# Patient Record
Sex: FEMALE | Race: WHITE | Employment: OTHER | URBAN - METROPOLITAN AREA
[De-identification: names, ages, dates, MRNs, and addresses within clinical notes are randomized per-mention and may not be internally consistent; named-entity substitution may affect disease eponyms.]

---

## 2021-11-06 ENCOUNTER — HOSPITAL ENCOUNTER (EMERGENCY)
Age: 56
Discharge: HOME | End: 2021-11-06
Payer: MEDICARE

## 2021-11-06 ENCOUNTER — APPOINTMENT (OUTPATIENT)
Dept: GENERAL RADIOLOGY | Age: 56
End: 2021-11-06
Payer: MEDICARE

## 2021-11-06 VITALS
TEMPERATURE: 97.5 F | WEIGHT: 170 LBS | DIASTOLIC BLOOD PRESSURE: 80 MMHG | RESPIRATION RATE: 18 BRPM | OXYGEN SATURATION: 96 % | HEART RATE: 62 BPM | SYSTOLIC BLOOD PRESSURE: 110 MMHG

## 2021-11-06 DIAGNOSIS — S42.202A CLOSED FRACTURE OF PROXIMAL END OF LEFT HUMERUS, UNSPECIFIED FRACTURE MORPHOLOGY, INITIAL ENCOUNTER: Primary | ICD-10-CM

## 2021-11-06 PROCEDURE — 73564 X-RAY EXAM KNEE 4 OR MORE: CPT

## 2021-11-06 PROCEDURE — 99283 EMERGENCY DEPT VISIT LOW MDM: CPT

## 2021-11-06 PROCEDURE — 6370000000 HC RX 637 (ALT 250 FOR IP): Performed by: NURSE PRACTITIONER

## 2021-11-06 PROCEDURE — 73030 X-RAY EXAM OF SHOULDER: CPT

## 2021-11-06 PROCEDURE — 73060 X-RAY EXAM OF HUMERUS: CPT

## 2021-11-06 RX ORDER — OXYCODONE HYDROCHLORIDE AND ACETAMINOPHEN 5; 325 MG/1; MG/1
1 TABLET ORAL ONCE
Status: COMPLETED | OUTPATIENT
Start: 2021-11-06 | End: 2021-11-06

## 2021-11-06 RX ORDER — OXYCODONE HYDROCHLORIDE AND ACETAMINOPHEN 5; 325 MG/1; MG/1
1 TABLET ORAL EVERY 6 HOURS PRN
Qty: 20 TABLET | Refills: 0 | Status: SHIPPED | OUTPATIENT
Start: 2021-11-06 | End: 2021-11-13

## 2021-11-06 RX ADMIN — OXYCODONE AND ACETAMINOPHEN 1 TABLET: 5; 325 TABLET ORAL at 11:08

## 2021-11-06 ASSESSMENT — PAIN SCALES - GENERAL
PAINLEVEL_OUTOF10: 10
PAINLEVEL_OUTOF10: 10

## 2021-11-06 NOTE — ED PROVIDER NOTES
[facility]  Department of Emergency Medicine   ED  Encounter Note  Admit Date/RoomTime: 2021 10:59 AM  ED Room: [address]    NAME: Oskar Guerra  : 1965  MRN: [id number]     Chief Complaint:  Arm Pain (broken humerus from fall yesterday, went to  and they couldnt fix it ) and Knee Pain    History of Present Illness       Oskar Guerra is a 64 y.o. old female who presents to the emergency department by private vehicle, for traumatic Left upper arm and shoulder pain which occured 1 day(s) prior to arrival.  The complaint is due to a fall from tripping while at home. She went to urgent care in [place], [place] yesterday for same. Had xrays, was told her arm was broken and was told to call any ortho when she gest home for follow-up. They were also told that xrays of her knee could not be done because of her arm injury. She is able to bear weight on left knee but c/o pain. She is right handed. The patients tetanus status is up to date. Since onset the symptoms have been stable. Her pain is aggraveated by any movement and relieved by nothing. She denies any head injury, neck pain, chest pain, abdominal pain, back pain, nausea or vomiting. ROS   Pertinent positives and negatives are stated within HPI, all other systems reviewed and are negative. Past Medical History:  has no past medical history on file. Surgical History:  has no past surgical history on file. Social History:  reports that she has never smoked. She has never used smokeless tobacco. She reports previous alcohol use. She reports previous drug use. Family History: family history is not on file.      Allergies: Asa [aspirin] and Iv dye [iodides]    Physical Exam   Oxygen Saturation Interpretation: Normal.        ED Triage Vitals   BP Temp Temp Source Pulse Resp SpO2 Height Weight   21 1056 21 1033 21 1033 21 1033 21 1056 21 1033 -- 21 1056   110/80 97.5 °F (36.4 °C) Oral 59 18 95 %  170 lb (77.1 kg)         · Constitutional:  Alert, development consistent with age. · HEENT:  NC/NT. Airway patent. · Neck:  Normal ROM. Supple. · Left Upper Extremity(s): upper arm and shoulder. Tenderness:  moderate. Swelling: None. Deformity: no deformity observed/palpated. ROM: unable to test due to pain. Skin:  no wounds, erythema, or swelling. Neurovascular: Motor deficit: none. Sensory deficit: none. Pulse deficit: none. Capillary refill: normal.  · Lymphatics: No lymphangitis or adenopathy noted. · Neurological:  Oriented. Motor functions intact. · Left knee: ttp, no deformity. Full ROM. No wounds, swelling  Lab / Imaging Results   (All laboratory and radiology results have been personally reviewed by myself)  Labs:  No results found for this visit on 11/06/21. Imaging: All Radiology results interpreted by Radiologist unless otherwise noted. XR HUMERUS LEFT (MIN 2 VIEWS)   Final Result   Comminuted left humeral neck fracture. XR KNEE LEFT (MIN 4 VIEWS)   Final Result   No fracture or dislocation. Joint spaces are intact. XR SHOULDER LEFT (MIN 2 VIEWS)   Final Result   Comminuted left humeral neck fracture. ED Course / Medical Decision Making     Medications   oxyCODONE-acetaminophen (PERCOCET) 5-325 MG per tablet 1 tablet (1 tablet Oral Given 11/6/21 1108)        MDM:    Imaging was obtained based on high suspicion for fracture / bony abnormality as per history/physical findings. Plan is subsequently for symptom control,  immobilization with appropriate outpatient follow-up with on-call ortho.   .    Plan of Care/Counseling:  ERICKA Caballero reviewed today's visit with the patient in addition to providing specific details for the plan of care and counseling regarding the diagnosis and prognosis. Questions are answered at this time and are agreeable with the plan. Assessment      1. Closed fracture of proximal end of left humerus, unspecified fracture morphology, initial encounter      Plan   Discharged home. Patient condition is good    New Medications     New Prescriptions    OXYCODONE-ACETAMINOPHEN (PERCOCET) 5-325 MG PER TABLET    Take 1 tablet by mouth every 6 hours as needed for Pain for up to 7 days. Electronically signed by ERICKA Fulton   DD: 11/6/21  **This report was transcribed using voice recognition software. Every effort was made to ensure accuracy; however, inadvertent computerized transcription errors may be present.   END OF ED PROVIDER NOTE       Carolina Paredes, [id number]  11/06/21 [id number]

## 2021-11-08 ENCOUNTER — TELEPHONE (OUTPATIENT)
Dept: ORTHOPEDIC SURGERY | Age: 56
End: 2021-11-08

## 2021-11-08 NOTE — TELEPHONE ENCOUNTER
Pt left  requesting to schedule appt for broke shoulder. ED note from 11/6:     History of Present Illness                              Oskar Guerra is a 64 y.o. old female who presents to the emergency department by private vehicle, for traumatic Left upper arm and shoulder pain which occured 1 day(s) prior to arrival.  The complaint is due to a fall from tripping while at home. She went to urgent care in [place], [place] yesterday for same. Had xrays, was told her arm was broken and was told to call any ortho when she gest home for follow-up. They were also told that xrays of her knee could not be done because of her arm injury. She is able to bear weight on left knee but c/o pain. She is right handed. The patients tetanus status is up to date. Since onset the symptoms have been stable. Her pain is aggraveated by any movement and relieved by nothing. She denies any head injury, neck pain, chest pain, abdominal pain, back pain, nausea or vomiting.     XR shoulder L:  Impression   Comminuted left humeral neck fracture.
